# Patient Record
Sex: MALE | Race: BLACK OR AFRICAN AMERICAN | NOT HISPANIC OR LATINO | URBAN - METROPOLITAN AREA
[De-identification: names, ages, dates, MRNs, and addresses within clinical notes are randomized per-mention and may not be internally consistent; named-entity substitution may affect disease eponyms.]

---

## 2020-08-07 ENCOUNTER — AMBULATORY SURGICAL CENTER (OUTPATIENT)
Dept: URBAN - METROPOLITAN AREA SURGERY 1 | Facility: SURGERY | Age: 70
End: 2020-08-07
Payer: COMMERCIAL

## 2020-08-07 ENCOUNTER — OFFICE (OUTPATIENT)
Dept: URBAN - METROPOLITAN AREA PATHOLOGY 22 | Facility: PATHOLOGY | Age: 70
End: 2020-08-07
Payer: COMMERCIAL

## 2020-08-07 VITALS
OXYGEN SATURATION: 96 % | DIASTOLIC BLOOD PRESSURE: 79 MMHG | SYSTOLIC BLOOD PRESSURE: 99 MMHG | HEART RATE: 59 BPM | HEART RATE: 61 BPM | DIASTOLIC BLOOD PRESSURE: 83 MMHG | DIASTOLIC BLOOD PRESSURE: 96 MMHG | SYSTOLIC BLOOD PRESSURE: 131 MMHG | DIASTOLIC BLOOD PRESSURE: 42 MMHG | OXYGEN SATURATION: 93 % | DIASTOLIC BLOOD PRESSURE: 78 MMHG | OXYGEN SATURATION: 96 % | RESPIRATION RATE: 16 BRPM | OXYGEN SATURATION: 93 % | SYSTOLIC BLOOD PRESSURE: 162 MMHG | SYSTOLIC BLOOD PRESSURE: 99 MMHG | DIASTOLIC BLOOD PRESSURE: 83 MMHG | RESPIRATION RATE: 16 BRPM | SYSTOLIC BLOOD PRESSURE: 128 MMHG | SYSTOLIC BLOOD PRESSURE: 131 MMHG | DIASTOLIC BLOOD PRESSURE: 96 MMHG | SYSTOLIC BLOOD PRESSURE: 162 MMHG | SYSTOLIC BLOOD PRESSURE: 162 MMHG | TEMPERATURE: 97.9 F | TEMPERATURE: 97.8 F | OXYGEN SATURATION: 99 % | HEART RATE: 53 BPM | SYSTOLIC BLOOD PRESSURE: 132 MMHG | DIASTOLIC BLOOD PRESSURE: 42 MMHG | HEIGHT: 71 IN | RESPIRATION RATE: 16 BRPM | OXYGEN SATURATION: 99 % | DIASTOLIC BLOOD PRESSURE: 78 MMHG | HEART RATE: 54 BPM | HEIGHT: 71 IN | DIASTOLIC BLOOD PRESSURE: 78 MMHG | HEART RATE: 59 BPM | TEMPERATURE: 97.8 F | OXYGEN SATURATION: 96 % | TEMPERATURE: 97.9 F | SYSTOLIC BLOOD PRESSURE: 132 MMHG | TEMPERATURE: 97.9 F | WEIGHT: 192 LBS | HEIGHT: 71 IN | HEART RATE: 54 BPM | DIASTOLIC BLOOD PRESSURE: 79 MMHG | HEART RATE: 61 BPM | SYSTOLIC BLOOD PRESSURE: 128 MMHG | SYSTOLIC BLOOD PRESSURE: 99 MMHG | SYSTOLIC BLOOD PRESSURE: 128 MMHG | HEART RATE: 59 BPM | TEMPERATURE: 97.8 F | DIASTOLIC BLOOD PRESSURE: 83 MMHG | OXYGEN SATURATION: 99 % | HEART RATE: 53 BPM | OXYGEN SATURATION: 95 % | DIASTOLIC BLOOD PRESSURE: 42 MMHG | OXYGEN SATURATION: 93 % | OXYGEN SATURATION: 95 % | HEART RATE: 61 BPM | OXYGEN SATURATION: 95 % | SYSTOLIC BLOOD PRESSURE: 131 MMHG | WEIGHT: 192 LBS | DIASTOLIC BLOOD PRESSURE: 79 MMHG | HEART RATE: 54 BPM | DIASTOLIC BLOOD PRESSURE: 96 MMHG | HEART RATE: 53 BPM | SYSTOLIC BLOOD PRESSURE: 132 MMHG | WEIGHT: 192 LBS

## 2020-08-07 DIAGNOSIS — Z12.11 ENCOUNTER FOR SCREENING FOR MALIGNANT NEOPLASM OF COLON: ICD-10-CM

## 2020-08-07 DIAGNOSIS — K63.5 POLYP OF COLON: ICD-10-CM

## 2020-08-07 DIAGNOSIS — K64.0 FIRST DEGREE HEMORRHOIDS: ICD-10-CM

## 2020-08-07 DIAGNOSIS — K57.30 DIVERTICULOSIS OF LARGE INTESTINE WITHOUT PERFORATION OR ABS: ICD-10-CM

## 2020-08-07 PROCEDURE — 88305 TISSUE EXAM BY PATHOLOGIST: CPT | Performed by: INTERNAL MEDICINE

## 2020-08-07 PROCEDURE — 45380 COLONOSCOPY AND BIOPSY: CPT | Mod: PT | Performed by: INTERNAL MEDICINE

## 2020-08-07 NOTE — SERVICENOTES
Start time: 0813
Cecum: 0815
TI intubation: no 
End time:0823

[place] Bowel Prep Score :  6
Right colon:                       2
transverse colon:                2
left colon:                          2

0: unprepared colon segment with mucosa not seen because of solid stool
1:portion of mucosa of colon segment seen but other areas of colon not well seen because of staining, residual stool, opaque liquid
2:minor amount of residual staining but mucosa of colon segment seen well
3:entire mucosa of colon segment seen well with no residual staining.

## 2020-08-07 NOTE — SERVICENOTES
Start time: 0813
Cecum: 0815
TI intubation: no 
End time:0823

[place] Bowel Prep Score :  6
Right colon:                       2
transverse colon:                2
left colon:                          2

0: unprepared colon segment with mucosa not seen because of solid stool
1:portion of mucosa of colon segment seen but other areas of colon not well seen because of staining, residual stool, opaque liquid
2:minor amount of residual staining but mucosa of colon segment seen well
3:entire mucosa of colon segment seen well with no residual staining.

## 2020-08-07 NOTE — SERVICENOTES
Start time: 0813
Cecum: 0815
TI intubation: no 
End time:0823

[place] Bowel Prep Score :  6
Right colon:                       2
transverse colon:                2
left colon:                          2

0: unprepared colon segment with mucosa not seen because of solid stool
1:portion of mucosa of colon segment seen but other areas of colon not well seen because of staining, residual stool, opaque liquid
2:minor amount of residual staining but mucosa of colon segment seen well
3:entire mucosa of colon segment seen well with no residual staining.